# Patient Record
(demographics unavailable — no encounter records)

---

## 2024-11-14 NOTE — REASON FOR VISIT
[Spouse] : spouse [FreeTextEntry2] : Patient is here today for left hip pain  Also some pain in his left nondominant thumb recently The hip is feeling a little better than when he was seen last time in 2 injections but still is concerned because of the pain when he lies on the left side

## 2024-11-14 NOTE — ASSESSMENT
[FreeTextEntry1] :  has trochanteric bursitis he is actually feeling better would recommend some Advil heat regularly it may also help his thumb no reason for hand consult this time unless we need to do a cortisone injection return as needed

## 2024-11-14 NOTE — HISTORY OF PRESENT ILLNESS
[de-identified] :  76-year-old gentleman having hip pain since last November getting in and out of a chair repeatedly using Tylenol and Biofreeze no problem the past had an injection by his internist appears to be more into the buttock did not really help much can take Advil does have problems with blood pressure cardiac issue did recently have a stress test pain at rest is a for lying on that side activity is to history of a stent no allergies does take aspirin does not smoke weight is good  he is tried therapy in the past really

## 2024-11-14 NOTE — HISTORY OF PRESENT ILLNESS
[de-identified] :  76-year-old gentleman having hip pain since last November getting in and out of a chair repeatedly using Tylenol and Biofreeze no problem the past had an injection by his internist appears to be more into the buttock did not really help much can take Advil does have problems with blood pressure cardiac issue did recently have a stress test pain at rest is a for lying on that side activity is to history of a stent no allergies does take aspirin does not smoke weight is good  he is tried therapy in the past really

## 2024-11-14 NOTE — IMAGING
[de-identified] :  pleasant easy to examine slender no back pain mild tenderness around SI joint on left more tenderness over the greater trochanter good hip motion full gait no neurologic deficits negative straight leg some tightness in dorsal lumbar fascia and hamstrings  X-rays left hip unremarkable Left hand swelling tenderness first CMC joint mild crepitus mild tenderness and laxity  X-ray left hand mild arthritis first CMC joint

## 2024-11-14 NOTE — IMAGING
[de-identified] :  pleasant easy to examine slender no back pain mild tenderness around SI joint on left more tenderness over the greater trochanter good hip motion full gait no neurologic deficits negative straight leg some tightness in dorsal lumbar fascia and hamstrings  X-rays left hip unremarkable Left hand swelling tenderness first CMC joint mild crepitus mild tenderness and laxity  X-ray left hand mild arthritis first CMC joint

## 2025-03-22 NOTE — ASSESSMENT
[FreeTextEntry1] : Mr. LEONIDES CRUZ is a 77 year old man with PMHx of CAD s/p multiple PCI (mLAD 7/7/99, pLAD 4/4/7, mLAD 9/20/23, mRCA 1/2/24) who is presenting for follow up.   -Order TTE to rule out structural changes -Order 7 day MCOT to evaluate HR trend due to sinus tachycardia in clinic -Continue ASA 81 daily -Continue livalo 4 daily for HLD -Continue losartan 50 daily for HTN  -Encouraged improved lifestyle modifications with these recommendations below: -Plant-based and Mediterranean diets, along with increased fruit, nut, vegetable, legume, and lean vegetable or animal protein (preferably fish) consumption -Engage in at least 150 minutes per week of accumulated moderate-intensity aerobic physical activity or 75 minutes per week of vigorous-intensity aerobic physical activity  Time in encounter, including face to face visit and time reviewing chart: 36  minutes  David Pollock MD, FACC Non-Invasive Cardiology [address], [address] Office: [phone]

## 2025-03-22 NOTE — REASON FOR VISIT
[Coronary Artery Disease] : coronary artery disease [FreeTextEntry1] : Mr. LEONIDES CRUZ is a 77 year old man with PMHx of CAD s/p multiple PCI (mLAD 7/7/99, pLAD 4/4/7, mLAD 9/20/23, mRCA 1/2/24) who is presenting for follow up. He previously followed with Dr Smith. He also follows with a cardiologist at [facility]. He feels well today. He has no chest pain, SOB or palpitations. He has no limitations with exertion.  He had muscle aches with crestor and lipitor.   ETT Echo 4/25/2024 Conclusions: 1. No electrocardiographic evidence of exercise-induced ischemia. 2. Patient achieved 10.3 METS, which is consistent with good exercise capacity.  TTE 6/29/2023 CONCLUSIONS:  1. The left ventricular systolic function is normal with an ejection fraction visually estimated at 60-65 %. 2. There is mild (grade 1) left ventricular diastolic dysfunction with normal filling pressure. 3. Mild aortic regurgitation. 4. Mild tricuspid regurgitation. 5. Compared to the transthoracic echocardiogram performed on 10/21/2020, there have been no significant interval changes.

## 2025-03-22 NOTE — ASSESSMENT
[FreeTextEntry1] : Mr. LEONIDES CRUZ is a 77 year old man with PMHx of CAD s/p multiple PCI (mLAD 7/7/99, pLAD 4/4/7, mLAD 9/20/23, mRCA 1/2/24) who is presenting for follow up.   -Order TTE to rule out structural changes -Order 7 day MCOT to evaluate HR trend due to sinus tachycardia in clinic -Continue ASA 81 daily -Continue livalo 4 daily for HLD -Continue losartan 50 daily for HTN  -Encouraged improved lifestyle modifications with these recommendations below: -Plant-based and Mediterranean diets, along with increased fruit, nut, vegetable, legume, and lean vegetable or animal protein (preferably fish) consumption -Engage in at least 150 minutes per week of accumulated moderate-intensity aerobic physical activity or 75 minutes per week of vigorous-intensity aerobic physical activity  Time in encounter, including face to face visit and time reviewing chart: 36  minutes  David Pollock MD, FACC Non-Invasive Cardiology [address], [address] Office: [phone]

## 2025-03-22 NOTE — REASON FOR VISIT
[Coronary Artery Disease] : coronary artery disease [FreeTextEntry1] : Mr. LEONIDES CRUZ is a 77 year old man with PMHx of CAD s/p multiple PCI (mLAD 7/7/99, pLAD 4/4/7, mLAD 9/20/23, mRCA 1/2/24) who is presenting for follow up. He previously followed with Dr Smith. He also follows with a cardiologist at [facility]. He feels well today. He has no chest pain, SOB or palpitations. He has no limitations with exertion.  He had muscle aches with crestor and lipitor.   ETT Echo 4/25/2024 Conclusions: 1. No electrocardiographic evidence of exercise-induced ischemia. 2. Patient achieved 10.3 METS, which is consistent with good exercise capacity.  TTE 6/29/2023 CONCLUSIONS:  1. The left ventricular systolic function is normal with an ejection fraction visually estimated at 60-65 %. 2. There is mild (grade 1) left ventricular diastolic dysfunction with normal filling pressure. 3. Mild aortic regurgitation. 4. Mild tricuspid regurgitation. 5. Compared to the transthoracic echocardiogram performed on 10/21/2020, there have been no significant interval changes.

## 2025-04-15 NOTE — PROCEDURE
[FreeTextEntry3] : Thumb CMC injection was performed because of pain inflammation and stiffness Anesthesia: ethyl chloride sprayed topically Dexamethasone: An injection of Dexamethasone 1cc  4mg/ml Lidocaine: An injection of Lidocaine 1% 1cc  Patient has tried OTC's including aspirin, Ibuprofen, Aleve etc or prescription NSAIDS, and/or exercises at home and/ or physical therapy without satisfactory response. After verbal consent using sterile preparation and technique. The risks, benefits, and alternatives to cortisone injection were explained in full to the patient. Risks outlined include but are not limited to infection, sepsis, bleeding, scarring, skin discoloration, temporary increase in pain, syncopal episode, failure to resolve symptoms, allergic reaction, symptom recurrence, and elevation of blood sugar in diabetics. Patient understood the risks. All questions were answered. After discussion of options, patient requested an injection. Oral informed consent was obtained and sterile prep was done of the injection site. Sterile technique was utilized for the procedure including the preparation of the solutions used for the injection. Patient tolerated the procedure well. Advised to ice the injection site this evening. Prep with ETOH locally to site. Sterile technique used.  The basal joint of the thumb was injected Left thumb basal joint

## 2025-04-15 NOTE — PHYSICAL EXAM
[de-identified] : Patient has tenderness to palpation along the base of the thumb.  There is normal capillary refill there is no erythema ecchymoses or abrasions there is no instability at the MP joint there is normal sensation.

## 2025-04-15 NOTE — ASSESSMENT
[FreeTextEntry1] : Patient has left-sided basal joint arthritis the natural history of this condition was discussed with the patient.  The surgical intervention was discussed cortisone injections were discussed anti-inflammatory use was discussed and he will discuss this with his cardiologist.  Bracing was discussed as well the patient opted for cortisone injection he knows not to get another 1 for 6 months

## 2025-04-15 NOTE — HISTORY OF PRESENT ILLNESS
[de-identified] : 77-year-old male left-sided thumb pain and discomfort.  Comes in today for evaluation.  He has not had any treatment for the condition.  Does not wear a brace.  Taking anti-inflammatories.  He does have a cardiac history is not on any blood thinners other than aspirin.

## 2025-04-15 NOTE — DATA REVIEWED
[FreeTextEntry1] : Radiographs from previous visit are reviewed documenting basal joint arthritis of the left thumb